# Patient Record
Sex: MALE | Race: WHITE | Employment: FULL TIME | URBAN - METROPOLITAN AREA
[De-identification: names, ages, dates, MRNs, and addresses within clinical notes are randomized per-mention and may not be internally consistent; named-entity substitution may affect disease eponyms.]

---

## 2020-03-22 ENCOUNTER — APPOINTMENT (OUTPATIENT)
Dept: GENERAL RADIOLOGY | Age: 60
End: 2020-03-22
Attending: EMERGENCY MEDICINE
Payer: COMMERCIAL

## 2020-03-22 ENCOUNTER — HOSPITAL ENCOUNTER (EMERGENCY)
Age: 60
Discharge: HOME | End: 2020-03-23
Attending: EMERGENCY MEDICINE
Payer: COMMERCIAL

## 2020-03-22 DIAGNOSIS — K80.81 BILIARY CALCULUS OF OTHER SITE WITH OBSTRUCTION: ICD-10-CM

## 2020-03-22 DIAGNOSIS — K57.32 SIGMOID DIVERTICULITIS: ICD-10-CM

## 2020-03-22 DIAGNOSIS — K80.50 BILIARY COLIC: Primary | ICD-10-CM

## 2020-03-22 LAB
BASOPHILS # BLD: 0.1 K/UL (ref 0–0.1)
BASOPHILS NFR BLD: 0 % (ref 0–1)
COMMENT, HOLDF: NORMAL
DIFFERENTIAL METHOD BLD: ABNORMAL
EOSINOPHIL # BLD: 0.4 K/UL (ref 0–0.4)
EOSINOPHIL NFR BLD: 3 % (ref 0–7)
ERYTHROCYTE [DISTWIDTH] IN BLOOD BY AUTOMATED COUNT: 14.1 % (ref 11.5–14.5)
HCT VFR BLD AUTO: 47.7 % (ref 36.6–50.3)
HGB BLD-MCNC: 15.2 G/DL (ref 12.1–17)
IMM GRANULOCYTES # BLD AUTO: 0.1 K/UL (ref 0–0.04)
IMM GRANULOCYTES NFR BLD AUTO: 1 % (ref 0–0.5)
LYMPHOCYTES # BLD: 2.6 K/UL (ref 0.8–3.5)
LYMPHOCYTES NFR BLD: 21 % (ref 12–49)
MCH RBC QN AUTO: 28.4 PG (ref 26–34)
MCHC RBC AUTO-ENTMCNC: 31.9 G/DL (ref 30–36.5)
MCV RBC AUTO: 89.2 FL (ref 80–99)
MONOCYTES # BLD: 0.9 K/UL (ref 0–1)
MONOCYTES NFR BLD: 8 % (ref 5–13)
NEUTS SEG # BLD: 8.1 K/UL (ref 1.8–8)
NEUTS SEG NFR BLD: 67 % (ref 32–75)
NRBC # BLD: 0 K/UL (ref 0–0.01)
NRBC BLD-RTO: 0 PER 100 WBC
PLATELET # BLD AUTO: 364 K/UL (ref 150–400)
PMV BLD AUTO: 9.3 FL (ref 8.9–12.9)
RBC # BLD AUTO: 5.35 M/UL (ref 4.1–5.7)
SAMPLES BEING HELD,HOLD: NORMAL
WBC # BLD AUTO: 12.1 K/UL (ref 4.1–11.1)

## 2020-03-22 PROCEDURE — 74011000250 HC RX REV CODE- 250: Performed by: FAMILY MEDICINE

## 2020-03-22 PROCEDURE — 71046 X-RAY EXAM CHEST 2 VIEWS: CPT

## 2020-03-22 PROCEDURE — 83690 ASSAY OF LIPASE: CPT

## 2020-03-22 PROCEDURE — 96374 THER/PROPH/DIAG INJ IV PUSH: CPT

## 2020-03-22 PROCEDURE — 96375 TX/PRO/DX INJ NEW DRUG ADDON: CPT

## 2020-03-22 PROCEDURE — 85025 COMPLETE CBC W/AUTO DIFF WBC: CPT

## 2020-03-22 PROCEDURE — 36415 COLL VENOUS BLD VENIPUNCTURE: CPT

## 2020-03-22 PROCEDURE — 74011250637 HC RX REV CODE- 250/637

## 2020-03-22 PROCEDURE — 93005 ELECTROCARDIOGRAM TRACING: CPT

## 2020-03-22 PROCEDURE — 74011250637 HC RX REV CODE- 250/637: Performed by: FAMILY MEDICINE

## 2020-03-22 PROCEDURE — 99285 EMERGENCY DEPT VISIT HI MDM: CPT

## 2020-03-22 PROCEDURE — 74011250637 HC RX REV CODE- 250/637: Performed by: EMERGENCY MEDICINE

## 2020-03-22 PROCEDURE — 84484 ASSAY OF TROPONIN QUANT: CPT

## 2020-03-22 PROCEDURE — 80053 COMPREHEN METABOLIC PANEL: CPT

## 2020-03-22 RX ORDER — GUAIFENESIN 100 MG/5ML
324 LIQUID (ML) ORAL
Status: COMPLETED | OUTPATIENT
Start: 2020-03-22 | End: 2020-03-22

## 2020-03-22 RX ORDER — ONDANSETRON 2 MG/ML
4 INJECTION INTRAMUSCULAR; INTRAVENOUS
Status: COMPLETED | OUTPATIENT
Start: 2020-03-22 | End: 2020-03-23

## 2020-03-22 RX ORDER — HYDROMORPHONE HYDROCHLORIDE 1 MG/ML
0.5 INJECTION, SOLUTION INTRAMUSCULAR; INTRAVENOUS; SUBCUTANEOUS ONCE
Status: COMPLETED | OUTPATIENT
Start: 2020-03-22 | End: 2020-03-23

## 2020-03-22 RX ORDER — GUAIFENESIN 100 MG/5ML
LIQUID (ML) ORAL
Status: DISCONTINUED
Start: 2020-03-22 | End: 2020-03-23 | Stop reason: HOSPADM

## 2020-03-22 RX ADMIN — LIDOCAINE HYDROCHLORIDE 40 ML: 20 SOLUTION ORAL; TOPICAL at 23:43

## 2020-03-22 RX ADMIN — ASPIRIN 81 MG 324 MG: 81 TABLET ORAL at 23:43

## 2020-03-22 NOTE — LETTER
`NOTIFICATION RETURN TO WORK / SCHOOL 
 
3/23/2020 1:13 AM 
 
Mr. Irving Vazquez [address] [address] [place] [id number] To Whom It May Concern: 
 
Irving Vazquez is currently under the care of [facility] EMERGENCY DEPT. He will return to work/school on: 03/24/2020 Irving Vazquez may return to work/school with the following restrictions: none. If there are questions or concerns please have the patient contact our office.  
 
 
 
Sincerely, 
 
 
Armando Rivera RN

## 2020-03-23 ENCOUNTER — APPOINTMENT (OUTPATIENT)
Dept: CT IMAGING | Age: 60
End: 2020-03-23
Attending: EMERGENCY MEDICINE
Payer: COMMERCIAL

## 2020-03-23 VITALS
WEIGHT: 235 LBS | HEART RATE: 70 BPM | DIASTOLIC BLOOD PRESSURE: 71 MMHG | HEIGHT: 70 IN | OXYGEN SATURATION: 96 % | BODY MASS INDEX: 33.64 KG/M2 | SYSTOLIC BLOOD PRESSURE: 163 MMHG | RESPIRATION RATE: 19 BRPM | TEMPERATURE: 98.3 F

## 2020-03-23 LAB
ALBUMIN SERPL-MCNC: 3.6 G/DL (ref 3.5–5)
ALBUMIN/GLOB SERPL: 0.8 {RATIO} (ref 1.1–2.2)
ALP SERPL-CCNC: 84 U/L (ref 45–117)
ALT SERPL-CCNC: 24 U/L (ref 12–78)
ANION GAP SERPL CALC-SCNC: 6 MMOL/L (ref 5–15)
AST SERPL-CCNC: 15 U/L (ref 15–37)
ATRIAL RATE: 69 BPM
BILIRUB SERPL-MCNC: 0.2 MG/DL (ref 0.2–1)
BUN SERPL-MCNC: 11 MG/DL (ref 6–20)
BUN/CREAT SERPL: 12 (ref 12–20)
CALCIUM SERPL-MCNC: 9.3 MG/DL (ref 8.5–10.1)
CALCULATED P AXIS, ECG09: 54 DEGREES
CALCULATED R AXIS, ECG10: 6 DEGREES
CALCULATED T AXIS, ECG11: 22 DEGREES
CHLORIDE SERPL-SCNC: 106 MMOL/L (ref 97–108)
CO2 SERPL-SCNC: 27 MMOL/L (ref 21–32)
CREAT SERPL-MCNC: 0.93 MG/DL (ref 0.7–1.3)
DIAGNOSIS, 93000: NORMAL
GLOBULIN SER CALC-MCNC: 4.5 G/DL (ref 2–4)
GLUCOSE SERPL-MCNC: 132 MG/DL (ref 65–100)
LIPASE SERPL-CCNC: 183 U/L (ref 73–393)
P-R INTERVAL, ECG05: 168 MS
POTASSIUM SERPL-SCNC: 3.6 MMOL/L (ref 3.5–5.1)
PROT SERPL-MCNC: 8.1 G/DL (ref 6.4–8.2)
Q-T INTERVAL, ECG07: 384 MS
QRS DURATION, ECG06: 96 MS
QTC CALCULATION (BEZET), ECG08: 411 MS
SODIUM SERPL-SCNC: 139 MMOL/L (ref 136–145)
TROPONIN I SERPL-MCNC: <0.05 NG/ML
VENTRICULAR RATE, ECG03: 69 BPM

## 2020-03-23 PROCEDURE — 74011636320 HC RX REV CODE- 636/320: Performed by: RADIOLOGY

## 2020-03-23 PROCEDURE — 74011250636 HC RX REV CODE- 250/636: Performed by: EMERGENCY MEDICINE

## 2020-03-23 PROCEDURE — 74177 CT ABD & PELVIS W/CONTRAST: CPT

## 2020-03-23 PROCEDURE — 74011250637 HC RX REV CODE- 250/637: Performed by: EMERGENCY MEDICINE

## 2020-03-23 RX ORDER — ONDANSETRON 4 MG/1
8 TABLET, ORALLY DISINTEGRATING ORAL
Status: COMPLETED | OUTPATIENT
Start: 2020-03-23 | End: 2020-03-23

## 2020-03-23 RX ORDER — ONDANSETRON 4 MG/1
4 TABLET, ORALLY DISINTEGRATING ORAL
Qty: 10 TAB | Refills: 0 | Status: SHIPPED | OUTPATIENT
Start: 2020-03-23 | End: 2020-12-23

## 2020-03-23 RX ORDER — ONDANSETRON 4 MG/1
TABLET, ORALLY DISINTEGRATING ORAL
Status: DISCONTINUED
Start: 2020-03-23 | End: 2020-03-23 | Stop reason: HOSPADM

## 2020-03-23 RX ORDER — HYDROMORPHONE HYDROCHLORIDE 1 MG/ML
1 INJECTION, SOLUTION INTRAMUSCULAR; INTRAVENOUS; SUBCUTANEOUS ONCE
Status: COMPLETED | OUTPATIENT
Start: 2020-03-23 | End: 2020-03-23

## 2020-03-23 RX ORDER — HYDROCODONE BITARTRATE AND ACETAMINOPHEN 5; 325 MG/1; MG/1
1 TABLET ORAL
Qty: 10 TAB | Refills: 0 | Status: SHIPPED | OUTPATIENT
Start: 2020-03-23 | End: 2020-03-26

## 2020-03-23 RX ORDER — AMOXICILLIN AND CLAVULANATE POTASSIUM 875; 125 MG/1; MG/1
1 TABLET, FILM COATED ORAL 2 TIMES DAILY
Qty: 14 TAB | Refills: 0 | Status: SHIPPED | OUTPATIENT
Start: 2020-03-23 | End: 2020-12-23

## 2020-03-23 RX ADMIN — ONDANSETRON 4 MG: 2 INJECTION INTRAMUSCULAR; INTRAVENOUS at 00:00

## 2020-03-23 RX ADMIN — HYDROMORPHONE HYDROCHLORIDE 0.5 MG: 1 INJECTION, SOLUTION INTRAMUSCULAR; INTRAVENOUS; SUBCUTANEOUS at 00:00

## 2020-03-23 RX ADMIN — ONDANSETRON 8 MG: 4 TABLET, ORALLY DISINTEGRATING ORAL at 01:20

## 2020-03-23 RX ADMIN — HYDROMORPHONE HYDROCHLORIDE 1 MG: 1 INJECTION, SOLUTION INTRAMUSCULAR; INTRAVENOUS; SUBCUTANEOUS at 00:28

## 2020-03-23 RX ADMIN — IOPAMIDOL 100 ML: 755 INJECTION, SOLUTION INTRAVENOUS at 00:36

## 2020-03-23 NOTE — ED NOTES
2337  Pt taken to XR via stretcher. 2348  PO meds given as ordered. Pt vomited after taking GI cocktail. MD made aware. 0030  Pt taken to CT via stretcher. 0041  Pt back from CT and states his abdominal pain is now at 3/10 with a \"burning feeling rather than sharp. \"

## 2020-03-23 NOTE — DISCHARGE INSTRUCTIONS
Follow up if you have worsening pain, severe nausea and vomiting, fevers, chills, lethargy. Eat a bland low fat diet. Follow up with General Surgery and your PCP. Diverticulitis: Care Instructions  Your Care Instructions    Diverticulitis occurs when pouches form in the wall of the colon and become inflamed or infected. It can be very painful. Doctors aren't sure what causes diverticulitis. There is no proof that foods such as nuts, seeds, or berries cause it or make it worse. A low-fiber diet may cause the colon to work harder to push stool forward. Pouches may form because of this extra work. It may be hard to think about healthy eating while you're in pain. But as you recover, you might think about how you can use healthy eating for overall better health. Healthy eating may help you avoid future attacks. Follow-up care is a key part of your treatment and safety. Be sure to make and go to all appointments, and call your doctor if you are having problems. It's also a good idea to know your test results and keep a list of the medicines you take. How can you care for yourself at home? · Drink plenty of fluids, enough so that your urine is light yellow or clear like water. If you have kidney, heart, or liver disease and have to limit fluids, talk with your doctor before you increase the amount of fluids you drink. · Stick to liquids or a bland diet (plain rice, bananas, dry toast or crackers, applesauce) until you are feeling better. Then you can return to regular foods and gradually increase the amount of fiber in your diet. · Use a heating pad set on low on your belly to relieve mild cramps and pain. · Get extra rest until you are feeling better. · Be safe with medicines. Read and follow all instructions on the label. ? If the doctor gave you a prescription medicine for pain, take it as prescribed.   ? If you are not taking a prescription pain medicine, ask your doctor if you can take an over-the-counter medicine. · If your doctor prescribed antibiotics, take them as directed. Do not stop taking them just because you feel better. You need to take the full course of antibiotics. To prevent future attacks of diverticulitis  · Avoid constipation:  ? Include fruits, vegetables, beans, and whole grains in your diet each day. These foods are high in fiber. ? Drink plenty of fluids, enough so that your urine is light yellow or clear like water. If you have kidney, heart, or liver disease and have to limit fluids, talk with your doctor before you increase the amount of fluids you drink. ? Get some exercise every day. Build up slowly to 30 to 60 minutes a day on 5 or more days of the week. ? Take a fiber supplement, such as Citrucel or Metamucil, every day if needed. Read and follow all instructions on the label. ? Schedule time each day for a bowel movement. Having a daily routine may help. Take your time and do not strain when having a bowel movement. When should you call for help? Call your doctor now or seek immediate medical care if:    · You have a fever.     · You are vomiting.     · You have new or worse belly pain.     · You cannot pass stools or gas.    Watch closely for changes in your health, and be sure to contact your doctor if you have any problems. Where can you learn more? Go to http://rhina-gia.info/  Enter H901 in the search box to learn more about \"Diverticulitis: Care Instructions. \"  Current as of: August 11, 2019Content Version: 12.4  © 6639-6927 Healthwise, Incorporated. Care instructions adapted under license by [employer] (which disclaims liability or warranty for this information). If you have questions about a medical condition or this instruction, always ask your healthcare professional. [place] [address] any warranty or liability for your use of this information.   Biliary Colic: Care Instructions  Your Care Instructions    Biliary (say \"BILL-ee-air-ee\") colic is belly pain caused by gallbladder problems. It is usually caused by a gallstone moving through or blocking the common bile duct or cystic duct. Gallstones are stones that form in the gallbladder. They are made of cholesterol and other substances. The gallbladder is a small sac located just under the liver. It stores bile released by the liver. Bile helps you digest fats. Gallstones also can form in the common bile duct or cystic duct. These ducts carry bile from the gallbladder and the liver to the small intestine. Gallstones may be as small as a grain of sand or as large as a golf ball. Gallstones that cause severe symptoms usually are treated with surgery to remove the gallbladder. If the first attack of biliary colic is mild, it is often safe to wait until you have had another attack before you think about having surgery. The doctor has checked you carefully, but problems can develop later. If you notice any problems or new symptoms, get medical treatment right away. Follow-up care is a key part of your treatment and safety. Be sure to make and go to all appointments, and call your doctor if you are having problems. It's also a good idea to know your test results and keep a list of the medicines you take. How can you care for yourself at home? · Take pain medicines exactly as directed. ? If the doctor gave you a prescription medicine for pain, take it as prescribed. ? If you are not taking a prescription pain medicine, ask your doctor if you can take an over-the-counter medicine. Read and follow all instructions on the label. · Avoid foods that cause symptoms, especially fatty foods. These can cause biliary colic. · You may need more tests to look at your gallbladder. When should you call for help?   Call your doctor now or seek immediate medical care if:    · You have a fever.     · You have new belly pain, or your pain gets worse.     · There is a new or increasing yellow tint to your skin or the whites of your eyes.     · Your urine is dark yellow-brown, or your stools are light-colored or white.     · You cannot keep down fluids.    Watch closely for changes in your health, and be sure to contact your doctor if:    · You do not get better as expected.     · You are not getting better after 1 day (24 hours). Where can you learn more? Go to http://rhina-gia.info/  Enter [id number] in the search box to learn more about \"Biliary Colic: Care Instructions. \"  Current as of: August 11, 2019Content Version: 12.4  © 6033-9847 [employer]. Care instructions adapted under license by [employer] (which disclaims liability or warranty for this information). If you have questions about a medical condition or this instruction, always ask your healthcare professional. [place] [address] any warranty or liability for your use of this information.      Low-Fat Diet for Gallbladder Disease: Care Instructions  Your Care Instructions    When you eat, the gallbladder releases bile, which helps you digest the fat in food. If you have an inflamed gallbladder, this may cause pain. A low-fat diet may give your gallbladder a rest so you can start to heal. Your doctor and dietitian can help you make an eating plan that does not irritate your digestive system. Always talk with your doctor or dietitian before you make changes in your diet. Follow-up care is a key part of your treatment and safety. Be sure to make and go to all appointments, and call your doctor if you are having problems. It's also a good idea to know your test results and keep a list of the medicines you take. How can you care for yourself at home? · Eat many small meals and snacks each day instead of three large meals. · Choose lean meats. ? Eat no more than 5 to 6½ ounces of meat a day. ? Cut off all fat you can see. ?  Eat chicken and turkey without the skin. ? Many types of fish, such as salmon, lake trout, tuna, and herring, provide healthy omega-3 fat. But, avoid fish canned in oil, such as sardines in olive oil. ? Bake, broil, or grill meats, poultry, or fish instead of frying them in butter or fat. · Drink or eat nonfat or low-fat milk, yogurt, cheese, or other milk products each day. ? Read the labels on cheeses, and choose those with less than 5 grams of fat an ounce. ? Try fat-free sour cream, cream cheese, or yogurt. ? Avoid cream soups and cream sauces on pasta. ? Eat low-fat ice cream, frozen yogurt, or sorbet. Avoid regular ice cream.  · Eat whole-grain cereals, breads, crackers, rice, or pasta. Avoid high-fat foods such as croissants, scones, biscuits, waffles, doughnuts, muffins, granola, and high-fat breads. · Flavor your foods with herbs and spices (such as basil, tarragon, or mint), fat-free sauces, or lemon juice instead of butter. You can also use butter substitutes, fat-free mayonnaise, or fat-free dressing. · Try applesauce, prune puree, or mashed bananas to replace some or all of the fat when you bake. · Limit fats and oils, such as butter, margarine, mayonnaise, and salad dressing, to no more than 1 tablespoon a meal.  · Avoid high-fat foods, such as:  ? Chocolate, whole milk, ice cream, and processed cheese. ? Fried or buttered foods. ? Sausage, salami, and romero. ? Cinnamon rolls, cakes, pies, cookies, and other pastries. ? Prepared snack foods, such as potato chips, nut and granola bars, and mixed nuts. ? Coconut and avocado. · Learn how to read food labels for serving sizes and ingredients. Fast-food and convenience-food meals often have lots of fat. Where can you learn more? Go to http://rhina-gia.info/  Enter D126 in the search box to learn more about \"Low-Fat Diet for Gallbladder Disease: Care Instructions. \"  Current as of: August 21, 2019Content Version: 12.4  © 0453-9951 Healthwise, Incorporated. Care instructions adapted under license by [employer] (which disclaims liability or warranty for this information).  If you have questions about a medical condition or this instruction, always ask your healthcare professional. [address] any warranty or liability for your use of this information.

## 2020-03-23 NOTE — ED PROVIDER NOTES
Jonette Hatchet is a 62 y.o. M with no significant PMH who presents for chest pain. Started at 9 PM tonight. States it is in his epigastric area and goes straight through to the back. States he had one episode of emesis with the pain. Denies fever, chills, shortness of breath. Notes a cough that has been \"on and off\" since December. Denies abd pain,  Dysuria, hematuria, recent travel, sick contact, [address] contacts, sore throat, congestion. He is not a smoker. Does not have a hx of heavy ETOH use. No past medical history on file. No past surgical history on file. No family history on file.     Social History     Socioeconomic History    Marital status:      Spouse name: Not on file    Number of children: Not on file    Years of education: Not on file    Highest education level: Not on file   Occupational History    Not on file   Social Needs    Financial resource strain: Not on file    Food insecurity     Worry: Not on file     Inability: Not on file    Transportation needs     Medical: Not on file     Non-medical: Not on file   Tobacco Use    Smoking status: Not on file   Substance and Sexual Activity    Alcohol use: Not on file    Drug use: Not on file    Sexual activity: Not on file   Lifestyle    Physical activity     Days per week: Not on file     Minutes per session: Not on file    Stress: Not on file   Relationships    Social connections     Talks on phone: Not on file     Gets together: Not on file     Attends Congregational service: Not on file     Active member of club or organization: Not on file     Attends meetings of clubs or organizations: Not on file     Relationship status: Not on file    Intimate partner violence     Fear of current or ex partner: Not on file     Emotionally abused: Not on file     Physically abused: Not on file     Forced sexual activity: Not on file   Other Topics Concern    Not on file   Social History Narrative    Not on file       ALLERGIES: Patient has no known allergies. Review of Systems   Constitutional: Negative for chills and fever. HENT: Negative for congestion and sore throat. Respiratory: Positive for cough. Negative for shortness of breath. Cardiovascular: Positive for chest pain. Gastrointestinal: Positive for abdominal pain and vomiting. Negative for constipation, diarrhea and nausea. Genitourinary: Negative for dysuria and hematuria. Neurological: Negative for dizziness and light-headedness. Vitals:    03/23/20 0000 03/23/20 0016 03/23/20 0045 03/23/20 0100   BP: (!) 161/98 (!) 176/95 (!) 167/111 163/71   Pulse:   72 70   Resp:  (!) 32 15 19   Temp:       SpO2:  96% 94% 91%   Weight:       Height:                Physical Exam  Vitals signs and nursing note reviewed. Constitutional:       General: He is not in acute distress. Appearance: He is well-developed. He is not ill-appearing or toxic-appearing. Cardiovascular:      Rate and Rhythm: Normal rate and regular rhythm. Heart sounds: Normal heart sounds. No murmur. Pulmonary:      Effort: Pulmonary effort is normal. No tachypnea, accessory muscle usage or respiratory distress. Breath sounds: Normal breath sounds. No stridor. Abdominal:      Palpations: Abdomen is soft. There is no mass. Tenderness: There is abdominal tenderness. There is no guarding or rebound. Comments: Tender to palpation in epigastric region and LUQ. Musculoskeletal:      Right lower leg: No edema. Left lower leg: No edema. Neurological:      Mental Status: He is alert. Psychiatric:         Mood and Affect: Mood normal.         Behavior: Behavior normal.        MDM       Procedures    EKG - NSR< 69, , QRS 96, QTc 411, PACs present, no gross ST changes, no peaked T waves    2:08 AM  WBC mildly elevated. Otherwise labs WNL. CXR NAP. CT Abd shows cholelithiasis but not cholecystitis and sigmoid diverticulitis.  Will discharge home with Augmentin, Zofran PRN nausea and Norco PRN pain. RTC fever, chills, lethargy, decreased po intake/ uop, worsening chest pain/ shortness of breath, worsening pain. Recommend low fat diet. Follow up with PCP and General Surgery to discuss gallstones.      Patient seen/ discussed with Dr. Princess Chalwa (Attending)    Alexandra Landa, DO

## 2020-03-23 NOTE — ED TRIAGE NOTES
Pt states he's been having chest pain since 2100.   Believed it was heartburn and had taken antacids w/o relief

## 2020-12-23 ENCOUNTER — OFFICE VISIT (OUTPATIENT)
Dept: PODIATRY | Age: 60
End: 2020-12-23
Payer: COMMERCIAL

## 2020-12-23 VITALS
HEART RATE: 66 BPM | SYSTOLIC BLOOD PRESSURE: 153 MMHG | DIASTOLIC BLOOD PRESSURE: 96 MMHG | OXYGEN SATURATION: 99 % | WEIGHT: 230.6 LBS | TEMPERATURE: 97.3 F | BODY MASS INDEX: 32.28 KG/M2 | HEIGHT: 71 IN

## 2020-12-23 DIAGNOSIS — L02.91 SIMPLE ABSCESS: Primary | ICD-10-CM

## 2020-12-23 DIAGNOSIS — M79.672 CHRONIC PAIN OF LEFT HEEL: ICD-10-CM

## 2020-12-23 DIAGNOSIS — G89.29 CHRONIC PAIN OF LEFT HEEL: ICD-10-CM

## 2020-12-23 PROCEDURE — 99204 OFFICE O/P NEW MOD 45 MIN: CPT | Performed by: PODIATRIST

## 2020-12-23 PROCEDURE — 10060 I&D ABSCESS SIMPLE/SINGLE: CPT | Performed by: PODIATRIST

## 2020-12-23 RX ORDER — MUPIROCIN 20 MG/G
OINTMENT TOPICAL DAILY
Qty: 22 G | Refills: 0 | Status: SHIPPED | OUTPATIENT
Start: 2020-12-23

## 2020-12-31 NOTE — PROGRESS NOTES
[facility]    Subjective:         Patient is a 56 y.o. male who is being seen as a new pt for left heel pain and right foot pain. Patient states she has been suffering from the pain for the past few days. She denies any history of trauma. She states the pain is worse in her right foot than her left heel. She states both feet are sharp and nonradiating. She states weightbearing exacerbates the pain. She states imaging has never been performed to interrogate the areas. She denies any systemic signs of infection but does state there is redness to the bottom of the right foot. She denies any other pedal complaints    History reviewed. No pertinent past medical history. Past Surgical History:   Procedure Laterality Date    HX CHOLECYSTECTOMY      IR CHOLECYSTOSTOMY PERCUTANEOUS         Family History   Problem Relation Age of Onset    Diabetes Mother     High Cholesterol Mother     Diabetes Father     High Cholesterol Father       Social History     Tobacco Use    Smoking status: Never Smoker    Smokeless tobacco: Never Used   Substance Use Topics    Alcohol use: Yes     No Known Allergies  Prior to Admission medications    Medication Sig Start Date End Date Taking? Authorizing Provider   mupirocin (BACTROBAN) 2 % ointment Apply  to affected area daily. 12/23/20  Yes Rajiv Old, DPM       Review of Systems   Constitutional: Negative. HENT: Negative. Eyes: Negative. Respiratory: Negative. Cardiovascular: Negative. Gastrointestinal: Negative. Endocrine: Negative. Genitourinary: Negative. Musculoskeletal: Negative. Skin: Negative. Allergic/Immunologic: Negative. Neurological: Negative. Hematological: Negative. Psychiatric/Behavioral: Negative. All other systems reviewed and are negative.       Objective:     Visit Vitals  BP (!) 153/96 (BP 1 Location: Left arm, BP Patient Position: Sitting)   Pulse 66   Temp 97.3 °F (36.3 °C) (Temporal)   Ht 5' 11\" (1.803 m)   Wt 230 lb 9.6 oz (104.6 kg)   SpO2 99%   BMI 32.16 kg/m²       Physical Exam  Vitals signs reviewed. Constitutional:       Appearance: He is overweight. Cardiovascular:      Pulses:           Dorsalis pedis pulses are 2+ on the right side and 2+ on the left side. Posterior tibial pulses are 2+ on the right side and 2+ on the left side. Pulmonary:      Effort: Pulmonary effort is normal.   Musculoskeletal:      Right lower leg: No edema. Left lower leg: No edema. Right foot: Decreased range of motion. No deformity, bunion or Charcot foot. Left foot: Decreased range of motion. No deformity, bunion or Charcot foot. Feet:      Right foot:      Protective Sensation: 10 sites tested. 10 sites sensed. Skin integrity: Erythema present. Toenail Condition: Right toenails are normal.      Left foot:      Protective Sensation: 10 sites tested. 10 sites sensed. Skin integrity: Skin integrity normal.      Toenail Condition: Left toenails are normal.   Lymphadenopathy:      Lower Body: No right inguinal adenopathy. No left inguinal adenopathy. Skin:     General: Skin is warm. Capillary Refill: Capillary refill takes 2 to 3 seconds. Findings: Abscess present. Neurological:      Mental Status: He is alert and oriented to person, place, and time. Psychiatric:         Mood and Affect: Mood and affect normal.         Behavior: Behavior is cooperative. Data Review: No results found for this or any previous visit (from the past 24 hour(s)). Impression:       ICD-10-CM ICD-9-CM    1. Simple abscess  L02.91 682.9 XR FOOT RT MIN 3 V   2. Chronic pain of left heel  M79.672 729.5 XR ANKLE LT MIN 3 V    G89.29 338.29          Recommendation:     Patient seen and evaluated in the office  And incision and drainage was performed to the plantar aspect of the right foot noted to the superficial abscess. Scant purulence was noted.   No anesthesia was needed. Appropriate dressing was applied.   Patient given a prescription for mupirocin 2% topical ointment for daily wound care  A prescription was given for an x-ray performed of the right foot to interrogate the area and of the left heel  Patient instructed to limit ambulation to the right foot and perform stretches to the left calf area

## 2021-01-12 ENCOUNTER — OFFICE VISIT (OUTPATIENT)
Dept: PODIATRY | Age: 61
End: 2021-01-12
Payer: COMMERCIAL

## 2021-01-12 VITALS
SYSTOLIC BLOOD PRESSURE: 152 MMHG | HEART RATE: 67 BPM | DIASTOLIC BLOOD PRESSURE: 87 MMHG | OXYGEN SATURATION: 96 % | TEMPERATURE: 97.7 F | BODY MASS INDEX: 33.1 KG/M2 | WEIGHT: 231.2 LBS | HEIGHT: 70 IN

## 2021-01-12 DIAGNOSIS — M79.672 CHRONIC PAIN OF LEFT HEEL: Primary | ICD-10-CM

## 2021-01-12 DIAGNOSIS — G89.29 CHRONIC PAIN OF LEFT HEEL: Primary | ICD-10-CM

## 2021-01-12 PROCEDURE — 99213 OFFICE O/P EST LOW 20 MIN: CPT | Performed by: PODIATRIST

## 2021-01-12 PROCEDURE — 29540 STRAPPING ANKLE &/FOOT: CPT | Performed by: PODIATRIST

## 2021-01-12 RX ORDER — MELOXICAM 15 MG/1
15 TABLET ORAL DAILY
Qty: 30 TAB | Refills: 2 | Status: SHIPPED | OUTPATIENT
Start: 2021-01-12

## 2021-01-12 NOTE — PROGRESS NOTES
[facility]    Subjective:         Patient is a 56 y.o. male who is being seen as a returning pt for bilateral foot pain and a wound check to the right foot. Patient states the incision and drainage site to the right foot has healed completely. He states he is gone for his x-rays and would like to discuss the findings. He continues to deny any trauma. He states the pain rises a level of 2 out of 10. States the pain is exacerbated with weightbearing and is sharp/nonradiating. He denies any systemic signs of infection. He denies any other pedal complaints    No past medical history on file. Past Surgical History:   Procedure Laterality Date    HX CHOLECYSTECTOMY      IR CHOLECYSTOSTOMY PERCUTANEOUS         Family History   Problem Relation Age of Onset    Diabetes Mother     High Cholesterol Mother     Diabetes Father     High Cholesterol Father       Social History     Tobacco Use    Smoking status: Never Smoker    Smokeless tobacco: Never Used   Substance Use Topics    Alcohol use: Yes     No Known Allergies  Prior to Admission medications    Medication Sig Start Date End Date Taking? Authorizing Provider   mupirocin (BACTROBAN) 2 % ointment Apply  to affected area daily. 12/23/20   Kyara Perez, DPM       Review of Systems   Constitutional: Negative. HENT: Negative. Eyes: Negative. Respiratory: Negative. Cardiovascular: Negative. Gastrointestinal: Negative. Endocrine: Negative. Genitourinary: Negative. Musculoskeletal: Negative. Skin: Negative. Allergic/Immunologic: Negative. Neurological: Negative. Hematological: Negative. Psychiatric/Behavioral: Negative. All other systems reviewed and are negative. Objective: There were no vitals taken for this visit. Physical Exam  Vitals signs reviewed. Constitutional:       Appearance: He is overweight.    Cardiovascular:      Pulses:           Dorsalis pedis pulses are 2+ on the right side and 2+ on the left side. Posterior tibial pulses are 2+ on the right side and 2+ on the left side. Pulmonary:      Effort: Pulmonary effort is normal.   Musculoskeletal:      Right lower leg: No edema. Left lower leg: No edema. Right foot: Decreased range of motion. No deformity, bunion or Charcot foot. Left foot: Decreased range of motion. No deformity, bunion or Charcot foot. Feet:      Right foot:      Protective Sensation: 10 sites tested. 10 sites sensed. Skin integrity: Skin integrity normal.      Toenail Condition: Right toenails are normal.      Left foot:      Protective Sensation: 10 sites tested. 10 sites sensed. Skin integrity: Skin integrity normal.      Toenail Condition: Left toenails are normal.   Lymphadenopathy:      Lower Body: No right inguinal adenopathy. No left inguinal adenopathy. Skin:     General: Skin is warm. Capillary Refill: Capillary refill takes 2 to 3 seconds. Neurological:      Mental Status: He is alert and oriented to person, place, and time. Psychiatric:         Mood and Affect: Mood and affect normal.         Behavior: Behavior is cooperative. Impression:     Chronic heel pain, right foot    Recommendation:     Patient seen and evaluated in the office  Personally reviewed x-ray images of bilateral feet, noting heel spur formation to the right foot. Treatment options discussed and patient is elected for conservative treatment. A foot/ankle strapping was applied to the right foot/ankle with an Ace bandage for symptomatic relief.   Patient tolerated well  Patient given a prescription for meloxicam 15 mg to be taken daily  Lastly, patient given a referral for physical therapy to eval and treat

## 2022-03-19 PROBLEM — L02.91: Status: ACTIVE | Noted: 2020-12-23

## 2022-03-20 PROBLEM — G89.29 CHRONIC PAIN OF LEFT HEEL: Status: ACTIVE | Noted: 2020-12-23

## 2022-03-20 PROBLEM — M79.672 CHRONIC PAIN OF LEFT HEEL: Status: ACTIVE | Noted: 2020-12-23

## 2023-05-19 RX ORDER — MELOXICAM 15 MG/1
15 TABLET ORAL DAILY
COMMUNITY
Start: 2021-01-12